# Patient Record
Sex: MALE | URBAN - METROPOLITAN AREA
[De-identification: names, ages, dates, MRNs, and addresses within clinical notes are randomized per-mention and may not be internally consistent; named-entity substitution may affect disease eponyms.]

---

## 2018-05-17 ENCOUNTER — IMPORTED ENCOUNTER (OUTPATIENT)
Dept: URBAN - METROPOLITAN AREA CLINIC 1 | Facility: CLINIC | Age: 53
End: 2018-05-17

## 2018-05-17 PROBLEM — H43.812: Noted: 2018-05-17

## 2018-05-17 PROBLEM — H43.392: Noted: 2018-05-17

## 2018-05-17 PROBLEM — H25.13: Noted: 2018-05-17

## 2018-05-17 PROCEDURE — 92004 COMPRE OPH EXAM NEW PT 1/>: CPT

## 2018-05-17 PROCEDURE — 92015 DETERMINE REFRACTIVE STATE: CPT

## 2018-05-17 NOTE — PATIENT DISCUSSION
1.  Floater OS- RD precautions given 2. Cataract OU: Observe for now without intervention. The patient was advised to contact us if any change or worsening of visionMRX for glasses givenReturn for an appointment in 1 year 27 with Dr. Tequila Jeter.

## 2018-05-17 NOTE — PATIENT DISCUSSION
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

## 2022-04-02 ASSESSMENT — TONOMETRY
OS_IOP_MMHG: 16
OD_IOP_MMHG: 16

## 2022-04-02 ASSESSMENT — VISUAL ACUITY
OS_SC: J2
OS_CC: 20/20-2
OD_SC: J2
OD_CC: 20/20